# Patient Record
Sex: FEMALE | Race: WHITE | NOT HISPANIC OR LATINO | Employment: FULL TIME | URBAN - METROPOLITAN AREA
[De-identification: names, ages, dates, MRNs, and addresses within clinical notes are randomized per-mention and may not be internally consistent; named-entity substitution may affect disease eponyms.]

---

## 2023-04-22 ENCOUNTER — HOSPITAL ENCOUNTER (EMERGENCY)
Facility: HOSPITAL | Age: 38
Discharge: HOME | End: 2023-04-22
Attending: EMERGENCY MEDICINE
Payer: COMMERCIAL

## 2023-04-22 VITALS
OXYGEN SATURATION: 97 % | WEIGHT: 215 LBS | HEART RATE: 71 BPM | DIASTOLIC BLOOD PRESSURE: 73 MMHG | SYSTOLIC BLOOD PRESSURE: 113 MMHG | RESPIRATION RATE: 18 BRPM | TEMPERATURE: 98 F | HEIGHT: 67 IN | BODY MASS INDEX: 33.74 KG/M2

## 2023-04-22 DIAGNOSIS — L02.91 ABSCESS: Primary | ICD-10-CM

## 2023-04-22 DIAGNOSIS — L03.114 CELLULITIS OF LEFT UPPER EXTREMITY: ICD-10-CM

## 2023-04-22 PROCEDURE — 10060 I&D ABSCESS SIMPLE/SINGLE: CPT

## 2023-04-22 PROCEDURE — 25000003 PHARM REV CODE 250: Performed by: EMERGENCY MEDICINE

## 2023-04-22 PROCEDURE — 99282 EMERGENCY DEPT VISIT SF MDM: CPT | Mod: 25

## 2023-04-22 RX ORDER — SULFAMETHOXAZOLE AND TRIMETHOPRIM 800; 160 MG/1; MG/1
1 TABLET ORAL 2 TIMES DAILY
Qty: 20 TABLET | Refills: 0 | Status: SHIPPED | OUTPATIENT
Start: 2023-04-22 | End: 2023-05-02

## 2023-04-22 RX ORDER — LIDOCAINE HYDROCHLORIDE 10 MG/ML
5 INJECTION, SOLUTION EPIDURAL; INFILTRATION; INTRACAUDAL; PERINEURAL
Status: COMPLETED | OUTPATIENT
Start: 2023-04-22 | End: 2023-04-22

## 2023-04-22 RX ORDER — HYDROCODONE BITARTRATE AND ACETAMINOPHEN 5; 325 MG/1; MG/1
1 TABLET ORAL EVERY 6 HOURS PRN
Qty: 12 TABLET | Refills: 0 | Status: SHIPPED | OUTPATIENT
Start: 2023-04-22

## 2023-04-22 RX ADMIN — LIDOCAINE HYDROCHLORIDE 50 MG: 10 INJECTION, SOLUTION EPIDURAL; INFILTRATION; INTRACAUDAL; PERINEURAL at 10:04

## 2023-04-22 NOTE — ED PROVIDER NOTES
Encounter Date: 4/22/2023       History     Chief Complaint   Patient presents with    Insect Bite     X 3 days ago. 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  The patient has recently started doxycycline without benefit.  She subjectively felt that she had had fever last night but none at the time of presentation to the ED. she denies any drug allergies.    Review of patient's allergies indicates:  No Known Allergies  No past medical history on file.  No past surgical history on file.  No family history on file.     Review of Systems   Constitutional:  Positive for activity change and fever.   HENT: Negative.  Negative for sore throat.    Respiratory: Negative.  Negative for shortness of breath.    Cardiovascular:  Negative for chest pain.   Gastrointestinal: Negative.  Negative for nausea.   Genitourinary:  Negative for dysuria.   Musculoskeletal:  Negative for back pain.   Skin:  Positive for color change and wound. Negative for rash.   Neurological:  Negative for weakness.   Hematological:  Does not bruise/bleed easily.   All other systems reviewed and are negative.    Physical Exam     Initial Vitals   BP Pulse Resp Temp SpO2   04/22/23 0900 04/22/23 0858 04/22/23 0858 04/22/23 0900 04/22/23 0858   129/75 88 18 97.9 °F (36.6 °C) 96 %      MAP       --                Physical Exam    Constitutional: She appears well-developed and well-nourished. No distress.   Appears uncomfortable   Musculoskeletal:         General: Tenderness and edema present.     Neurological: She is alert and oriented to person, place, and time.   Skin: Skin is warm and dry. Capillary refill takes less than 2 seconds. Abscess  noted. There is erythema.   Left extensor forearm, in the midportion, has evidence of an abscess with a surrounding cellulitis.       ED Course   Procedures  Labs Reviewed - No data to display       Imaging Results    None          Medications   LIDOcaine (PF) 10 mg/ml (1%) injection 50 mg (has no administration in time range)                Attending Attestation:             Attending ED Notes:   Procedure note:  The patient's abscess was cleansed with Betadine solution anesthetized with 5 cc of 1% lidocaine plain.  Incision was made with 11. Blade and pus retrieved.  The patient had the base cleansed and quarter-inch gauze packing placed and a dressing then applied.                 Clinical Impression:   Final diagnoses:  [L02.91] Abscess (Primary)  [L03.114] Cellulitis of left upper extremity        ED Disposition Condition    Discharge Stable          ED Prescriptions       Medication Sig Dispense Start Date End Date Auth. Provider    sulfamethoxazole-trimethoprim 800-160mg (BACTRIM DS) 800-160 mg Tab Take 1 tablet by mouth 2 (two) times daily. for 10 days 20 tablet 4/22/2023 5/2/2023 Wil Gomez Jr., MD    HYDROcodone-acetaminophen (NORCO) 5-325 mg per tablet Take 1 tablet by mouth every 6 (six) hours as needed for Pain. 12 tablet 4/22/2023 -- Wil Gomez Jr., MD          Follow-up Information       Follow up With Specialties Details Why Contact Info    Your Primary Care Doctor   As needed              Wil Gomez Jr., MD  04/22/23 1010

## 2023-04-22 NOTE — ED NOTES
"C/O FEVER, NAUSEA AND WIRSENIBG OF LESION AT L POSTERIOR ARM.  PINPOINT AREA WITH SURROUNDING REDNESS AND SOME MOTTLING.  + MOVEMENT, SENSATION AND <3" CAPILLARY REFILL DISTAL TO SITE. CALL LIGHT IN REACH.  ALERT, ORIENTED AND AMBULATORY.   "

## 2023-04-22 NOTE — Clinical Note
"Evelyne Espinoza" David was seen and treated in our emergency department on 4/22/2023.  She may return to work on 05/21/2023.       If you have any questions or concerns, please don't hesitate to call.      MARY ABDUL RN    "

## 2023-04-22 NOTE — DISCHARGE INSTRUCTIONS
Stop doxycycline.  Start Bactrim and Norco for pain.  Watch for any fever, worsening pain swelling or redness.  If problems deteriorate, return to the hospital immediately.  Remove packing in 2 days then start warm soaks

## 2023-04-22 NOTE — ED NOTES
"DRY DRESSING PER TECH POST I/D. NO SS BLEEDING. gOOD DISTAL PULSE  AND <3" CAPILLARY REFILL DISTAL TO SITE.  "